# Patient Record
Sex: FEMALE | Race: OTHER | HISPANIC OR LATINO
[De-identification: names, ages, dates, MRNs, and addresses within clinical notes are randomized per-mention and may not be internally consistent; named-entity substitution may affect disease eponyms.]

---

## 2021-01-11 PROBLEM — Z00.00 ENCOUNTER FOR PREVENTIVE HEALTH EXAMINATION: Status: ACTIVE | Noted: 2021-01-11

## 2021-01-15 ENCOUNTER — APPOINTMENT (OUTPATIENT)
Dept: COLORECTAL SURGERY | Facility: CLINIC | Age: 66
End: 2021-01-15
Payer: MEDICARE

## 2021-01-15 VITALS
SYSTOLIC BLOOD PRESSURE: 127 MMHG | WEIGHT: 146 LBS | DIASTOLIC BLOOD PRESSURE: 78 MMHG | HEIGHT: 58 IN | HEART RATE: 73 BPM | TEMPERATURE: 98.1 F | BODY MASS INDEX: 30.64 KG/M2

## 2021-01-15 DIAGNOSIS — M19.90 UNSPECIFIED OSTEOARTHRITIS, UNSPECIFIED SITE: ICD-10-CM

## 2021-01-15 DIAGNOSIS — D04.5 CARCINOMA IN SITU OF SKIN OF TRUNK: ICD-10-CM

## 2021-01-15 DIAGNOSIS — Z80.0 FAMILY HISTORY OF MALIGNANT NEOPLASM OF DIGESTIVE ORGANS: ICD-10-CM

## 2021-01-15 DIAGNOSIS — Z86.018 PERSONAL HISTORY OF OTHER BENIGN NEOPLASM: ICD-10-CM

## 2021-01-15 DIAGNOSIS — K21.9 GASTRO-ESOPHAGEAL REFLUX DISEASE W/OUT ESOPHAGITIS: ICD-10-CM

## 2021-01-15 DIAGNOSIS — Z80.3 FAMILY HISTORY OF MALIGNANT NEOPLASM OF BREAST: ICD-10-CM

## 2021-01-15 DIAGNOSIS — Z87.19 PERSONAL HISTORY OF OTHER DISEASES OF THE DIGESTIVE SYSTEM: ICD-10-CM

## 2021-01-15 DIAGNOSIS — Z78.9 OTHER SPECIFIED HEALTH STATUS: ICD-10-CM

## 2021-01-15 DIAGNOSIS — Z82.5 FAMILY HISTORY OF ASTHMA AND OTHER CHRONIC LOWER RESPIRATORY DISEASES: ICD-10-CM

## 2021-01-15 DIAGNOSIS — M81.0 AGE-RELATED OSTEOPOROSIS W/OUT CURRENT PATHOLOGICAL FRACTURE: ICD-10-CM

## 2021-01-15 PROCEDURE — 99202 OFFICE O/P NEW SF 15 MIN: CPT | Mod: 25

## 2021-01-15 PROCEDURE — 99072 ADDL SUPL MATRL&STAF TM PHE: CPT

## 2021-01-15 PROCEDURE — 46600 DIAGNOSTIC ANOSCOPY SPX: CPT

## 2021-01-15 RX ORDER — LOPERAMIDE HYDROCHLORIDE 2 MG/1
CAPSULE, LIQUID FILLED ORAL
Refills: 0 | Status: ACTIVE | COMMUNITY

## 2021-01-15 RX ORDER — ALENDRONATE SODIUM 70 MG/1
70 TABLET ORAL
Refills: 0 | Status: ACTIVE | COMMUNITY

## 2021-01-15 NOTE — PHYSICAL EXAM
[Excoriation] : no perianal excoriation [Skin Tags] : residual hemorrhoidal skin tags were noted [None] : there was no rectal mass  [de-identified] : Anal pap performed [FreeTextEntry1] : A lighted anoscope was passed into the anal canal and the entire anal mucosal surface was inspected..  The findings revealed moderate internal hemorrhoids. No masses or lesions were identified.\par \par

## 2021-01-15 NOTE — HISTORY OF PRESENT ILLNESS
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 Anthony\par H/o hemorrhoids, s/p hemorrhoidectomy 1/2/08 with Dr. Garcia at [facility]. Reports that based on the pathology patient was diagnosed with carcinoma in situ. She was last evaluated by a provider for this issue 3-4 years ago. She was going annually for anoscopy and anal pap smears\par S/p hysterectomy for large fibroids and ovarian cysts in 1999, last cervical pap smear prior to that \par Denies h/o condyloma or HSV\par \par \par Reports occasional BRBPR on the TP when cleaning.\par C/o itching and burning that requires cleaning the area with soap and water for relief of symptoms per patient\par BH: 2-3 times daily, soft and formed \par BM's fluctuate between constipation and diarrhea for most of her life. Has never been evaluated by a GI\par Admits to limited dietary fiber and water intake\par Denies use of stool softeners, fiber supplements or laxatives\par (+) anal receptive sex in the past \par Colonoscopy completed January 2013\par Denies FMH of GI disorders. FMH of throat cancer in grandmother, several aunts with breast and cervical cancer \par No NSAIDs last 7 days. Took ASA 81 mg yesterday

## 2021-04-08 LAB — ANAL PAP CYTOLOGY: NORMAL

## 2021-10-01 ENCOUNTER — TRANSCRIPTION ENCOUNTER (OUTPATIENT)
Age: 66
End: 2021-10-01

## 2021-10-05 ENCOUNTER — TRANSCRIPTION ENCOUNTER (OUTPATIENT)
Age: 66
End: 2021-10-05

## 2022-03-14 ENCOUNTER — NON-APPOINTMENT (OUTPATIENT)
Age: 67
End: 2022-03-14

## 2022-03-14 ENCOUNTER — APPOINTMENT (OUTPATIENT)
Dept: COLORECTAL SURGERY | Facility: CLINIC | Age: 67
End: 2022-03-14
Payer: MEDICARE

## 2022-03-14 VITALS
HEART RATE: 84 BPM | WEIGHT: 141 LBS | BODY MASS INDEX: 29.6 KG/M2 | SYSTOLIC BLOOD PRESSURE: 112 MMHG | HEIGHT: 58 IN | DIASTOLIC BLOOD PRESSURE: 76 MMHG | TEMPERATURE: 98.3 F

## 2022-03-14 PROCEDURE — 46600 DIAGNOSTIC ANOSCOPY SPX: CPT

## 2022-03-14 PROCEDURE — 99214 OFFICE O/P EST MOD 30 MIN: CPT | Mod: 25

## 2022-03-14 NOTE — HISTORY OF PRESENT ILLNESS
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 \par Pt initially referred by PCP Dr. Anthony. \par H/o Hemorrhoids s/p hemorrhoidectomy 1/2/08 by Dr. Garcia at [facility], pathology showed HGD w/ CIS\par h/o of annual anoscopy and anal paps, last evaluated approx 2018\par PSH hysterectomy for large fibroids and ovarian cyst 1999\par Negative Pap smear, no h/o condyloma or HSV\par \par Seen in the office 1/15/21 for initial consultation: External hemorrhoid residual hemorrhoidal skin tags noted. No rectal tenderness and no mass appreciated. Moderate internal hemorrhoids on anoscopy. \par Anal pap Negative\par \par Of note, pt c/o 2 week hx of liquid stools 2-3x/daily. She has attempted a bland diet without much relief. Has yet to take anti-diarrheal or increase fiber to her diet. Has not seen a GI recently.\par \par (+) anal receptive sex in the past \par Colonoscopy completed January 2013\par Denies FMH of GI disorders. FMH of throat cancer in grandmother, several aunts with breast and cervical cancer \par

## 2022-03-14 NOTE — ASSESSMENT
[FreeTextEntry1] : I have reviewed with the patient the clinical and natural history of human papilloma virus and its relationship to the anus. The risks and associated consequences including sexual transmission, anal warts, anal dysplasia, and the risk of anal cancer have been outlined. The need for long-term surveillance and followup has been detailed. The treatment options including high resolution anoscopy and its associated risk of recurrence and post procedure stricture and pain compared to continued close surveillance and monitoring were reviewed. The patient wishes to proceed with surveillance and management of identified visible/palpable lesions as identified or high grade ( HGSIL) dysplasia identified on cytology.\par \par \par Advised appropriate perianal hygiene and care instructions.\par \par Recommend consultation with GI for surveillance colonoscopy.

## 2022-03-14 NOTE — PHYSICAL EXAM
[Excoriation] : no perianal excoriation [Skin Tags] : residual hemorrhoidal skin tags were noted [Normal] : was normal [None] : there was no rectal mass  [de-identified] : Anal pap performed [FreeTextEntry1] : Medical assistant was present for the entire exam.\par \par Anoscopy was performed for evaluation of the patients rectal bleeding  history .\par The risks, benefits and alternatives were reviewed.\par \par A lighted anoscope was passed into the anal canal and the entire anal mucosal surface was inspected..  \par The findings revealed moderate internal hemorrhoids.\par No masses or lesions were identified.\par \par

## 2022-03-21 ENCOUNTER — NON-APPOINTMENT (OUTPATIENT)
Age: 67
End: 2022-03-21

## 2022-03-21 LAB — ANAL PAP CYTOLOGY: NORMAL

## 2022-03-28 ENCOUNTER — APPOINTMENT (OUTPATIENT)
Dept: PULMONOLOGY | Facility: CLINIC | Age: 67
End: 2022-03-28
Payer: MEDICARE

## 2022-03-28 VITALS
HEIGHT: 58 IN | OXYGEN SATURATION: 98 % | BODY MASS INDEX: 29.6 KG/M2 | DIASTOLIC BLOOD PRESSURE: 76 MMHG | TEMPERATURE: 97.9 F | WEIGHT: 141 LBS | HEART RATE: 65 BPM | SYSTOLIC BLOOD PRESSURE: 108 MMHG

## 2022-03-28 DIAGNOSIS — R05.3 CHRONIC COUGH: ICD-10-CM

## 2022-03-28 PROCEDURE — 99204 OFFICE O/P NEW MOD 45 MIN: CPT

## 2022-03-28 NOTE — REASON FOR VISIT
"See message below.    Last office visit 10-13-21 with TOVA Borden for tinnitus and chronic rhinitis.    Per dictation:    \"Tinnitus, bilateral  - etiology unknown but suspect related to allergies vs virus vs Covid residual so will trial:  - fluticasone (FLONASE) 50 MCG/ACT nasal spray; Spray 1 spray into both nostrils twice daily prn  - cetirizine (ZYRTEC) 10 MG tablet; Take 1 tablet (10 mg) by mouth daily  - if no resolve will consider referral to ENT     Chronic rhinitis  - will trial medications as directed.  - fluticasone (FLONASE) 50 MCG/ACT nasal spray; Spray 1 spray into both nostrils  Twice daily prn  - cetirizine (ZYRTEC) 10 MG tablet; Take 1 tablet (10 mg) by mouth daily  - if no resolve, may need to consider antibiotic\"    Next step? Antibiotic?  Referral to ENT?    Please advise, thanks.  Routed to covering provider(s).          " [Consultation] : a consultation

## 2022-03-30 LAB — SARS-COV-2 N GENE NPH QL NAA+PROBE: NOT DETECTED

## 2022-03-31 NOTE — HISTORY OF PRESENT ILLNESS
[TextBox_4] : 03/28/2022: Asked to evaluate patient by Dr Anthony for chronic cough x years. Coughs most days for years. More at night. Doesn't change with weather or seasons. Not dyspneic. Never smoker. No asthma, does have allergies. Has tried OTCs without relief. Sinus lavage helps some. Takes Flonase daily. Takes OTC allergy pills with no relief of cough. Has heartburn, takes omeprazole off and on, not daily. Retired . Didn't have Covid, vaxxed and boosted.\par

## 2022-03-31 NOTE — CONSULT LETTER
[Dear  ___] : Dear  [unfilled], [( Thank you for referring [unfilled] for consultation for _____ )] : Thank you for referring [unfilled] for consultation for [unfilled] [Please see my note below.] : Please see my note below. [Consult Closing:] : Thank you very much for allowing me to participate in the care of this patient.  If you have any questions, please do not hesitate to contact me. [Sincerely,] : Sincerely, [FreeTextEntry2] : Sean Anthony, \par [address] \[place] [address]\par  [FreeTextEntry3] : Sabine Mcclain MD, FCCP\par

## 2022-03-31 NOTE — ASSESSMENT
[FreeTextEntry1] : Data reviewed:\par \par PA/lat CXR [facility] 3/2021 personally reviewed : unremarkable\par \par Impression:\par Chronic cough x years\par \par Plan:\par CXR was unremarkable one year ago in a never smoker w cough x 6-7 years; not repeated.\par Return for MCT and FENO.\par If there is no evidence for asthma, then treating cough would likely require some trials of treating both PND and GERD; discussed.\par --\par FENO 3/31/22: 15 but on pred since yesterday for joints - reschedule MCT

## 2022-04-12 ENCOUNTER — APPOINTMENT (OUTPATIENT)
Dept: PULMONOLOGY | Facility: CLINIC | Age: 67
End: 2022-04-12

## 2022-07-27 ENCOUNTER — APPOINTMENT (OUTPATIENT)
Dept: GASTROENTEROLOGY | Facility: CLINIC | Age: 67
End: 2022-07-27

## 2023-05-01 ENCOUNTER — APPOINTMENT (OUTPATIENT)
Dept: COLORECTAL SURGERY | Facility: CLINIC | Age: 68
End: 2023-05-01
Payer: MEDICARE

## 2023-05-01 ENCOUNTER — NON-APPOINTMENT (OUTPATIENT)
Age: 68
End: 2023-05-01

## 2023-05-01 VITALS
WEIGHT: 143 LBS | TEMPERATURE: 98.3 F | HEIGHT: 58 IN | HEART RATE: 72 BPM | DIASTOLIC BLOOD PRESSURE: 79 MMHG | SYSTOLIC BLOOD PRESSURE: 124 MMHG | BODY MASS INDEX: 30.02 KG/M2

## 2023-05-01 DIAGNOSIS — K62.82 DYSPLASIA OF ANUS: ICD-10-CM

## 2023-05-01 PROCEDURE — 99214 OFFICE O/P EST MOD 30 MIN: CPT | Mod: 25

## 2023-05-01 PROCEDURE — 46600 DIAGNOSTIC ANOSCOPY SPX: CPT

## 2023-05-01 NOTE — HISTORY OF PRESENT ILLNESS
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 Garcia at [facility], pathology showed focal HGD w/ CIS\par (+) anal receptive sex in the past \par Colonoscopy completed January 2013\par PSH: hysterectomy for large fibroids and ovarian cyst 1999\par Negative Pap smear, no h/o condyloma or HSV\par Denies FMH of GI disorders. FMH of throat cancer in grandmother, several aunts with breast and cervical cancer \par \par Initially seen in the office 1/15/21 for initial consultation: External hemorrhoid residual hemorrhoidal skin tags noted. No rectal tenderness and no mass appreciated. Moderate internal hemorrhoids on anoscopy. \par Anal pap done and came back Negative\par \par Last seen on 3/14/2022, Exam noted external hemorrhoid, residual hemorrhoidal skin tags. Anoscopy findings revealed, moderate internal hemorrhoids. Anal pap repeated: Negative \par \par Today, pt c/o itchiness and discomfort on perianal area off and of the past year\par denies\par \par BH: mostly daily but sometimes gets constipated\par \par use of ASA/NSAIDs for the past week\par \par

## 2023-05-01 NOTE — ASSESSMENT
[FreeTextEntry1] : I have reviewed with the patient the clinical and natural history of human papilloma virus and its relationship to the anus. The risks and associated consequences including sexual transmission, anal warts, anal dysplasia, and the risk of anal cancer have been outlined. The need for long-term surveillance and followup has been detailed. The treatment options including high resolution anoscopy and its associated risk of recurrence and post procedure stricture and pain compared to continued close surveillance and monitoring were reviewed. 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  Recommendations including appropriate perianal hygiene changes, avoidance of soaps and astringents, lubricating lotions and avoidance of excessive wiping detailed.\par \par Advised increasing fiber regimen.\par \par

## 2023-05-12 ENCOUNTER — NON-APPOINTMENT (OUTPATIENT)
Age: 68
End: 2023-05-12

## 2023-05-12 LAB — ANAL PAP CYTOLOGY: NORMAL
